# Patient Record
(demographics unavailable — no encounter records)

---

## 2017-02-03 RX ORDER — PRAVASTATIN SODIUM 20 MG/1
TABLET ORAL
Qty: 30 TAB | Refills: 6 | Status: SHIPPED | OUTPATIENT
Start: 2017-02-03 | End: 2017-09-14 | Stop reason: SDUPTHER

## 2017-02-13 RX ORDER — METOPROLOL SUCCINATE 25 MG/1
25 TABLET, EXTENDED RELEASE ORAL
Qty: 30 TAB | Refills: 6 | Status: SHIPPED | OUTPATIENT
Start: 2017-02-13 | End: 2017-09-14 | Stop reason: SDUPTHER

## 2017-04-24 RX ORDER — APIXABAN 5 MG/1
TABLET, FILM COATED ORAL
Qty: 60 TAB | Refills: 12 | Status: SHIPPED | OUTPATIENT
Start: 2017-04-24

## 2017-09-14 RX ORDER — METOPROLOL SUCCINATE 25 MG/1
TABLET, EXTENDED RELEASE ORAL
Qty: 30 TAB | Refills: 6 | Status: SHIPPED | OUTPATIENT
Start: 2017-09-14 | End: 2018-04-12 | Stop reason: SDUPTHER

## 2017-09-14 RX ORDER — PRAVASTATIN SODIUM 20 MG/1
TABLET ORAL
Qty: 30 TAB | Refills: 6 | Status: SHIPPED | OUTPATIENT
Start: 2017-09-14 | End: 2018-04-12 | Stop reason: SDUPTHER

## 2018-05-10 RX ORDER — APIXABAN 5 MG/1
TABLET, FILM COATED ORAL
Qty: 60 TAB | Refills: 4 | OUTPATIENT
Start: 2018-05-10

## 2018-05-15 NOTE — TELEPHONE ENCOUNTER
Spoke with patient  Verified patient with 2 patient identifiers    Informed need schedule follow up appointment with Dr Josseline Miller prior to medication refill. Patient verbalized understanding, will call to schedule appointment.

## 2019-01-31 RX ORDER — PRAVASTATIN SODIUM 20 MG/1
TABLET ORAL
Qty: 30 TAB | Refills: 6 | Status: SHIPPED | OUTPATIENT
Start: 2019-01-31

## 2019-01-31 RX ORDER — METOPROLOL SUCCINATE 25 MG/1
TABLET, EXTENDED RELEASE ORAL
Qty: 30 TAB | Refills: 6 | Status: SHIPPED | OUTPATIENT
Start: 2019-01-31

## 2019-03-22 NOTE — TELEPHONE ENCOUNTER
[facility]    Palliative Care Progress Note    Patient: Loan Anderson  Date of Admission:  3/2/2019    Recommendations/Assessment/Plan:  1)  Fracture related pain - doing better on diclofenac gel off lidocaine.  Much reduced.  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  Could add heat/ice/diclofenac gel prn.  3)  Seizures - managed by neurology with medication changes as per their notes - stable  4)  Advanced PD - plans per Neuro/Pharm recommendations to adjust type of carbadopa used to avoid dyskinesias which have been moderately severe & additive to difficulty to improve.  5)  Encephalopathy/delirium - clearing - pt appeared capacitated to participate in treat ment decisions today - Express agreement with current level & goals of care for limited restorative plan.  6)  Debility - related to PD & acute illness process - improving but will need ongoing rehab efforts as tolerated/arranged  7)  OPD/nutrition - eating more but still needing supplemental NGT feeds - reviewed with family - Decision to continue vs discontinue when otherwise appropriate for discharge to allow to eat as can reviewed with family/pt.  8)  Family coping - improved with condition improvement & good care team coordinationcommunication of care.    Josey Valencia MD, \[facility] [place] [facility]\"" Expert[employer]  Cell:  [phone]  [facility] Inpatient Team Consult pager [phone] (M-F 8-4:30)  After-hours Answering Service [phone]   Main Palliative Clinic - [facility] 1[facility]: [phone]     History of Present Illness   75 yo F with prolonged hospitalization due to complications of seizures, advanced parkinison's disease , delirium & aspiration/hypoxia.  She is overall improving slowly but not yet ready for discharge with issues detailed as above.      Medications   I have reviewed this patient's medication profile and medications given in the past 24 hours.  Called patient to inform need appointment prior to medication refill. Patient not available, unable to leave voicemail message.     Review of Systems   Palliative Symptom Review (0=no symptom/no concern, 1=mild, 2=moderate, 3=severe):  Pain: 2 in R neck/head  Fatigue: 2  Nausea: 0  Constipation: 0  Diarrhea: 0  Depressive Symptoms: 0  Anxiety: 0  Drowsiness: variable 1-2  Poor Appetite: 2  Shortness of Breath: 1  Insomnia: 0  Delirium: 0-1  Other: dyskinesias related to medication adm -3   Overall (0 good/no concerns, 3 very poor): 2    Physical Exam   Vital Signs: Temp: 96.1  F (35.6  C) Temp src: Oral BP: 142/55   Heart Rate: 68 Resp: 16 SpO2: 95 % O2 Device: Nasal cannula Oxygen Delivery: 2 LPM  Weight: 196 lbs 0 oz    Physical Exam:  Alert, verbally fluent & appropriate emotions including joking.  Leans to R with neck/head pain as above.  Repositioned & supported with resolution of pain.  Comfortable.  Minimal dyskinesia noted.  NGT/NO2 in place.  Lungs:  CTPA with good inspiratory effort w/o pain.  Chest wall w/o tenderness in areas of known rib frx  Cor:  RRR  Abd;  Obese, benign  Ext:  W/o edema    Data reviewed today:   Labs - renal function normal    Total time spent was 25 minutes,  >50% of time was spent counseling and/or coordination of care regarding pain & sxs management, discharge issues relevant to goals of care, family support.